# Patient Record
Sex: MALE | Race: BLACK OR AFRICAN AMERICAN | NOT HISPANIC OR LATINO | URBAN - METROPOLITAN AREA
[De-identification: names, ages, dates, MRNs, and addresses within clinical notes are randomized per-mention and may not be internally consistent; named-entity substitution may affect disease eponyms.]

---

## 2022-01-21 ENCOUNTER — OFFICE VISIT (OUTPATIENT)
Dept: URBAN - METROPOLITAN AREA CLINIC 96 | Facility: CLINIC | Age: 52
End: 2022-01-21

## 2024-05-30 ENCOUNTER — OFFICE VISIT (OUTPATIENT)
Dept: URBAN - METROPOLITAN AREA CLINIC 96 | Facility: CLINIC | Age: 54
End: 2024-05-30
Payer: COMMERCIAL

## 2024-05-30 ENCOUNTER — DASHBOARD ENCOUNTERS (OUTPATIENT)
Age: 54
End: 2024-05-30

## 2024-05-30 ENCOUNTER — LAB OUTSIDE AN ENCOUNTER (OUTPATIENT)
Dept: URBAN - METROPOLITAN AREA CLINIC 96 | Facility: CLINIC | Age: 54
End: 2024-05-30

## 2024-05-30 VITALS
HEIGHT: 70 IN | BODY MASS INDEX: 29.69 KG/M2 | DIASTOLIC BLOOD PRESSURE: 87 MMHG | HEART RATE: 92 BPM | RESPIRATION RATE: 18 BRPM | TEMPERATURE: 97.9 F | WEIGHT: 207.4 LBS | SYSTOLIC BLOOD PRESSURE: 108 MMHG

## 2024-05-30 DIAGNOSIS — Z98.890: ICD-10-CM

## 2024-05-30 DIAGNOSIS — K83.8 DILATED BILE DUCT: ICD-10-CM

## 2024-05-30 DIAGNOSIS — R93.89 ABNORMAL CT SCAN: ICD-10-CM

## 2024-05-30 DIAGNOSIS — K59.09 CHRONIC CONSTIPATION: ICD-10-CM

## 2024-05-30 DIAGNOSIS — Z87.11 HISTORY OF PEPTIC ULCER: ICD-10-CM

## 2024-05-30 PROBLEM — 161615003: Status: ACTIVE | Noted: 2024-05-30

## 2024-05-30 PROBLEM — 129679001: Status: ACTIVE | Noted: 2024-05-30

## 2024-05-30 PROBLEM — 266998003: Status: ACTIVE | Noted: 2024-05-30

## 2024-05-30 PROBLEM — 123608004: Status: ACTIVE | Noted: 2024-05-30

## 2024-05-30 PROBLEM — 236069009: Status: ACTIVE | Noted: 2024-05-30

## 2024-05-30 PROCEDURE — 99204 OFFICE O/P NEW MOD 45 MIN: CPT

## 2024-05-30 RX ORDER — CHLORHEXIDINE GLUCONATE 4 %
TAKE 1 TABLET (325 MG) BY ORAL ROUTE 2 TIMES PER DAY FOR 90 DAYS LIQUID (ML) TOPICAL 2
Qty: 180 | Refills: 1 | Status: ACTIVE | COMMUNITY
Start: 2017-05-17

## 2024-05-31 ENCOUNTER — LAB OUTSIDE AN ENCOUNTER (OUTPATIENT)
Dept: URBAN - METROPOLITAN AREA CLINIC 96 | Facility: CLINIC | Age: 54
End: 2024-05-31

## 2024-05-31 ENCOUNTER — TELEPHONE ENCOUNTER (OUTPATIENT)
Dept: URBAN - METROPOLITAN AREA CLINIC 96 | Facility: CLINIC | Age: 54
End: 2024-05-31

## 2024-06-05 ENCOUNTER — LAB OUTSIDE AN ENCOUNTER (OUTPATIENT)
Dept: URBAN - METROPOLITAN AREA CLINIC 96 | Facility: CLINIC | Age: 54
End: 2024-06-05

## 2024-06-05 ENCOUNTER — TELEPHONE ENCOUNTER (OUTPATIENT)
Dept: URBAN - METROPOLITAN AREA CLINIC 96 | Facility: CLINIC | Age: 54
End: 2024-06-05

## 2024-06-13 ENCOUNTER — LAB OUTSIDE AN ENCOUNTER (OUTPATIENT)
Dept: URBAN - METROPOLITAN AREA CLINIC 96 | Facility: CLINIC | Age: 54
End: 2024-06-13

## 2024-06-17 ENCOUNTER — TELEPHONE ENCOUNTER (OUTPATIENT)
Dept: URBAN - METROPOLITAN AREA CLINIC 96 | Facility: CLINIC | Age: 54
End: 2024-06-17

## 2024-06-24 ENCOUNTER — LAB OUTSIDE AN ENCOUNTER (OUTPATIENT)
Dept: URBAN - METROPOLITAN AREA CLINIC 96 | Facility: CLINIC | Age: 54
End: 2024-06-24

## 2024-06-24 ENCOUNTER — OFFICE VISIT (OUTPATIENT)
Dept: URBAN - METROPOLITAN AREA CLINIC 96 | Facility: CLINIC | Age: 54
End: 2024-06-24

## 2024-06-24 RX ORDER — CHLORHEXIDINE GLUCONATE 4 %
TAKE 1 TABLET (325 MG) BY ORAL ROUTE 2 TIMES PER DAY FOR 90 DAYS LIQUID (ML) TOPICAL 2
Qty: 180 | Refills: 1 | Status: ACTIVE | COMMUNITY
Start: 2017-05-17

## 2024-06-24 NOTE — HPI-TODAY'S VISIT:
MRI abdomen with and without contrast, 6/13/2024: Gallbladder distended with layering dependent material, likely representing sludge.  No adjacent pericholecystic fluid, though there is hyperenhancement of gallbladder wall, particularly at the level of the fundus.  Central intrahepatic biliary ductal prominence is present.  Common bile duct measures up to 1.3 cm in diameter and is fairly torturous though no definite filling defects to confirm choledocholithiasis are present.  No definite focal stricture is identified.  Mildly prominent caliber of the main pancreatic duct which measures 3 mm.  No focal pancreatic parenchymal lesion is identified.

## 2024-07-01 ENCOUNTER — TELEPHONE ENCOUNTER (OUTPATIENT)
Dept: URBAN - METROPOLITAN AREA CLINIC 96 | Facility: CLINIC | Age: 54
End: 2024-07-01

## 2024-07-10 ENCOUNTER — LAB OUTSIDE AN ENCOUNTER (OUTPATIENT)
Dept: URBAN - METROPOLITAN AREA TELEHEALTH 2 | Facility: TELEHEALTH | Age: 54
End: 2024-07-10

## 2024-07-10 ENCOUNTER — OFFICE VISIT (OUTPATIENT)
Dept: URBAN - METROPOLITAN AREA TELEHEALTH 2 | Facility: TELEHEALTH | Age: 54
End: 2024-07-10

## 2024-07-10 RX ORDER — CHLORHEXIDINE GLUCONATE 4 %
TAKE 1 TABLET (325 MG) BY ORAL ROUTE 2 TIMES PER DAY FOR 90 DAYS LIQUID (ML) TOPICAL 2
Qty: 180 | Refills: 1 | Status: ACTIVE | COMMUNITY
Start: 2017-05-17

## 2024-07-19 ENCOUNTER — LAB OUTSIDE AN ENCOUNTER (OUTPATIENT)
Dept: URBAN - METROPOLITAN AREA CLINIC 96 | Facility: CLINIC | Age: 54
End: 2024-07-19

## 2024-07-19 ENCOUNTER — OFFICE VISIT (OUTPATIENT)
Dept: URBAN - METROPOLITAN AREA CLINIC 96 | Facility: CLINIC | Age: 54
End: 2024-07-19
Payer: COMMERCIAL

## 2024-07-19 ENCOUNTER — TELEPHONE ENCOUNTER (OUTPATIENT)
Dept: URBAN - METROPOLITAN AREA CLINIC 96 | Facility: CLINIC | Age: 54
End: 2024-07-19

## 2024-07-19 DIAGNOSIS — R93.89 ABNORMAL CT SCAN: ICD-10-CM

## 2024-07-19 DIAGNOSIS — K83.8 DILATED BILE DUCT: ICD-10-CM

## 2024-07-19 DIAGNOSIS — K59.03 DRUG-INDUCED CONSTIPATION: ICD-10-CM

## 2024-07-19 DIAGNOSIS — Z87.11 HISTORY OF PEPTIC ULCER: ICD-10-CM

## 2024-07-19 PROBLEM — 21782001: Status: ACTIVE | Noted: 2024-07-19

## 2024-07-19 PROCEDURE — 99213 OFFICE O/P EST LOW 20 MIN: CPT

## 2024-07-19 RX ORDER — CHLORHEXIDINE GLUCONATE 4 %
TAKE 1 TABLET (325 MG) BY ORAL ROUTE 2 TIMES PER DAY FOR 90 DAYS LIQUID (ML) TOPICAL 2
Qty: 180 | Refills: 1 | Status: ACTIVE | COMMUNITY
Start: 2017-05-17
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
## 2024-07-19 NOTE — HPI-TODAY'S VISIT:
54 year old male presents for follow-up after MRI.  Results listed below. Denies RUQ pain, epigastric pain, dysphagia, odynophagia. One episode of emesis recently. Bowel movements are still infrequent - with intermittent lower abdominal pain that is relieved with a BM. . MRI abdomen with and without contrast, 6/13/2024: Gallbladder distended with layering dependent material, likely representing sludge.  No adjacent pericholecystic fluid, though there is hyperenhancement of gallbladder wall, particularly at the level of the fundus.  Central intrahepatic biliary ductal prominence is present.  Common bile duct measures up to 1.3 cm in diameter and is fairly torturous though no definite filling defects to confirm choledocholithiasis are present.  No definite focal stricture is identified.  Mildly prominent caliber of the main pancreatic duct which measures 3 mm.  No focal pancreatic parenchymal lesion is identified.

## 2024-08-28 ENCOUNTER — OFFICE VISIT (OUTPATIENT)
Dept: URBAN - METROPOLITAN AREA MEDICAL CENTER 28 | Facility: MEDICAL CENTER | Age: 54
End: 2024-08-28

## 2024-08-28 RX ORDER — CHLORHEXIDINE GLUCONATE 4 %
TAKE 1 TABLET (325 MG) BY ORAL ROUTE 2 TIMES PER DAY FOR 90 DAYS LIQUID (ML) TOPICAL 2
Qty: 180 | Refills: 1 | Status: ACTIVE | COMMUNITY
Start: 2017-05-17

## 2024-08-30 ENCOUNTER — TELEPHONE ENCOUNTER (OUTPATIENT)
Dept: URBAN - METROPOLITAN AREA CLINIC 98 | Facility: CLINIC | Age: 54
End: 2024-08-30

## 2025-07-24 ENCOUNTER — WEB ENCOUNTER (OUTPATIENT)
Dept: URBAN - METROPOLITAN AREA CLINIC 96 | Facility: CLINIC | Age: 55
End: 2025-07-24